# Patient Record
Sex: FEMALE
[De-identification: names, ages, dates, MRNs, and addresses within clinical notes are randomized per-mention and may not be internally consistent; named-entity substitution may affect disease eponyms.]

---

## 2020-06-26 ENCOUNTER — HOSPITAL ENCOUNTER (EMERGENCY)
Dept: HOSPITAL 97 - ER | Age: 30
Discharge: HOME | End: 2020-06-26
Payer: COMMERCIAL

## 2020-06-26 DIAGNOSIS — R10.9: ICD-10-CM

## 2020-06-26 DIAGNOSIS — Z88.0: ICD-10-CM

## 2020-06-26 DIAGNOSIS — O26.892: Primary | ICD-10-CM

## 2020-06-26 LAB
BUN BLD-MCNC: 8 MG/DL (ref 7–18)
GLUCOSE SERPLBLD-MCNC: 101 MG/DL (ref 74–106)
HCG SERPL-ACNC: (no result) MIU/ML (ref 1–3)
HCT VFR BLD CALC: 41.1 % (ref 36–45)
LYMPHOCYTES # SPEC AUTO: 1.3 K/UL (ref 0.7–4.9)
PMV BLD: 10.8 FL (ref 7.6–11.3)
POTASSIUM SERPL-SCNC: 3.7 MMOL/L (ref 3.5–5.1)
RBC # BLD: 4.38 M/UL (ref 3.86–4.86)
UA COMPLETE W REFLEX CULTURE PNL UR: (no result)

## 2020-06-26 PROCEDURE — 87490 CHLMYD TRACH DNA DIR PROBE: CPT

## 2020-06-26 PROCEDURE — 84702 CHORIONIC GONADOTROPIN TEST: CPT

## 2020-06-26 PROCEDURE — 81015 MICROSCOPIC EXAM OF URINE: CPT

## 2020-06-26 PROCEDURE — 76705 ECHO EXAM OF ABDOMEN: CPT

## 2020-06-26 PROCEDURE — 99284 EMERGENCY DEPT VISIT MOD MDM: CPT

## 2020-06-26 PROCEDURE — 76815 OB US LIMITED FETUS(S): CPT

## 2020-06-26 PROCEDURE — 85025 COMPLETE CBC W/AUTO DIFF WBC: CPT

## 2020-06-26 PROCEDURE — 87088 URINE BACTERIA CULTURE: CPT

## 2020-06-26 PROCEDURE — 87086 URINE CULTURE/COLONY COUNT: CPT

## 2020-06-26 PROCEDURE — 87590 N.GONORRHOEAE DNA DIR PROB: CPT

## 2020-06-26 PROCEDURE — 81003 URINALYSIS AUTO W/O SCOPE: CPT

## 2020-06-26 PROCEDURE — 86900 BLOOD TYPING SEROLOGIC ABO: CPT

## 2020-06-26 PROCEDURE — 80048 BASIC METABOLIC PNL TOTAL CA: CPT

## 2020-06-26 PROCEDURE — 86901 BLOOD TYPING SEROLOGIC RH(D): CPT

## 2020-06-26 PROCEDURE — 81025 URINE PREGNANCY TEST: CPT

## 2020-06-26 PROCEDURE — 36415 COLL VENOUS BLD VENIPUNCTURE: CPT

## 2020-06-26 NOTE — ER
Nurse's Notes                                                                                     

 [facility]                                                                 

Name: Bhargav Ojeda                                                                              

Age: 30 yrs                                                                                       

Sex: Female                                                                                       

: 1990                                                                                   

MRN: [id number]                                                                                   

Arrival Date: 2020                                                                          

Time: 18:38                                                                                       

Account#: [id number]                                                                            

Bed 6                                                                                             

Private MD:                                                                                       

Diagnosis: Flank Pain;Pregnancy                                                                   

                                                                                                  

Presentation:                                                                                     

                                                                                             

19:06 Chief complaint: Patient states: Pain from Lower left side of my back that goes to the  ca1 

      front . And I having sharp pain underneath my belly button area down to my vaginal          

      area. My OB told me to come to the ER. Pregnant 18 wks and 4 days. Denies vaginal           

      bleeding. Denies urinary symptoms. Took Tylenol extra strength 1.5 hrs PTA. Coronavirus     

      screen: Proceed with normal triage. Patient denies a cough. Patient denies shortness of     

      breath or difficulty breathing. Patient denies measured and/or subjective temperature       

      greater than 100.4F prior to today's visit. Patient denies travel on a cruise ship or       

      to a country the [facility] currently lists as an affected area. Patient denies contact with       

      known and/or suspected case of COVID-19. Ebola Screen: Patient negative for fever           

      greater than or equal to 101.5 degrees Fahrenheit, and additional compatible Ebola          

      Virus Disease symptoms Patient denies exposure to infectious person. Patient denies         

      travel to an Ebola-affected area in the 21 days before illness onset. No symptoms or        

      risks identified at this time. Initial Sepsis Screen: Does the patient meet any 2           

      criteria? No. Patient's initial sepsis screen is negative. Does the patient have a          

      suspected source of infection? No. Patient's initial sepsis screen is negative. Risk        

      Assessment: Do you want to hurt yourself or someone else? Patient reports no desire to      

      harm self or others. Onset of symptoms was 2020.                                   

19:06 Method Of Arrival: Ambulatory                                                           ca1 

19:06 Acuity: SERENA 3                                                                           ca1 

                                                                                                  

OB/GYN:                                                                                           

19:10  5, Full Term 4, Living 4, LMP 2020                                         ca1 

                                                                                                  

Historical:                                                                                       

- Allergies:                                                                                      

19:10 PENICILLINS;                                                                            ca1 

- Home Meds:                                                                                      

19:10 Prenatal Vitamin Oral [Active];                                                         ca1 

- PMHx:                                                                                           

19:10 Asthma;                                                                                 ca1 

- PSHx:                                                                                           

19:10 None;                                                                                   ca1 

                                                                                                  

- Immunization history:: Adult Immunizations up to date.                                          

- Social history:: Smoking status: Patient denies any tobacco usage or history of.                

                                                                                                  

                                                                                                  

Screenin:41 Abuse screen: Denies threats or abuse. Denies injuries from another. Nutritional        rr5 

      screening: No deficits noted. Tuberculosis screening: No symptoms or risk factors           

      identified. Fall Risk None identified. Total Means Fall Scale indicates No Risk (0-24       

      pts).                                                                                       

                                                                                                  

Assessment:                                                                                       

20:10 General: Appears in no apparent distress. uncomfortable, Behavior is calm, cooperative, rr5 

      appropriate for age.                                                                        

20:10 Pain: Complains of pain in pelvis Pain radiates to left flank Pain currently is 6 out   rr5 

      of 10 on a pain scale. Quality of pain is described as sharp, stabbing, Pain began          

      gradually, Is intermittent. Neuro: Level of Consciousness is awake, alert, obeys            

      commands, Oriented to person, place, time, situation. Cardiovascular: Capillary refill      

      < 3 seconds Patient's skin is warm and dry. Respiratory: Airway is patent Respiratory       

      effort is even, unlabored, Respiratory pattern is regular, symmetrical. GI: No signs        

      and/or symptoms were reported involving the gastrointestinal system. : Reports pain       

      in left flank(s), pelvic area. EENT: No signs and/or symptoms were reported regarding       

      the EENT system. Derm: Skin is intact, is healthy with good turgor, Skin temperature is     

      warm. Musculoskeletal: Circulation, motion, and sensation intact. Capillary refill < 3      

      seconds.                                                                                    

20:41 Reassessment: Patient appears in no apparent distress at this time. Patient is alert,   rr5 

      oriented x 3, equal unlabored respirations, skin warm/dry/pink. awaiting for results.       

21:15 Reassessment: Patient appears in no apparent distress at this time. ultrasound at       rr5 

      bedside.                                                                                    

22:33 Reassessment: Patient appears in no apparent distress at this time. Patient is alert,   rr5 

      oriented x 3, equal unlabored respirations, skin warm/dry/pink. no complaints made.         

23:41 Reassessment: Patient appears in no apparent distress at this time. Patient is alert,   rr5 

      oriented x 3, equal unlabored respirations, skin warm/dry/pink. awaiting for ultrasound     

      result.                                                                                     

23:59 Reassessment: Patient appears in no apparent distress at this time. discharge           rr5 

      instruction given and explained without complaints made.                                    

                                                                                                  

Vital Signs:                                                                                      

19:06  / 72; Pulse 75; Resp 16 S; Temp 97.3(TE); Pulse Ox 100% on R/A; Weight 64.41 kg  ca1 

      (R); Height 5 ft. 1 in. (154.94 cm) (R); Pain 6/10;                                         

20:30  / 78; Pulse 80; Resp 17; Pulse Ox 99% ;                                          rr5 

21:30  / 61; Pulse 75; Resp 19; Pulse Ox 100% ;                                         rr5 

22:14  / 85; Pulse 81; Resp 18; Pulse Ox 100% on R/A;                                   rv  

23:30  / 70; Pulse 79; Resp 17; Temp 98; Pulse Ox 98% ;                                 rr5 

19:06 Body Mass Index 26.83 (64.41 kg, 154.94 cm)                                             ca1 

                                                                                                  

ED Course:                                                                                        

18:38 Patient arrived in ED.                                                                  ag5 

19:09 Triage completed.                                                                       ca1 

19:10 Arm band placed on right wrist.                                                         ca1 

20:29 Ed Engel MD is Attending Physician.                                             mh7 

20:30 Maurizio Davenport RN is Primary Nurse.                                                    rr5 

20:30 Pulse ox on. NIBP on.                                                                   rr5 

20:32 Urine Culture Sent.                                                                     ca1 

20:32 Urine Microscopic Only Sent.                                                            ca1 

21:40 US OB Limited In Process Unspecified.                                                   EDMS

21:40 US Abdomen Limited In Process Unspecified.                                              EDMS

21:40 Inserted saline lock: 20 gauge in right hand, using aseptic technique. Blood collected. rr5 

22:38 Patient has correct armband on for positive identification. Bed in low position. Call   rr5 

      light in reach.                                                                             

23:55 IV discontinued, intact, bleeding controlled, No redness/swelling at site. Pressure     rr5 

      dressing applied.                                                                           

                                                                                             

00:23 No provider procedures requiring assistance completed.                                  rr5 

                                                                                                  

Administered Medications:                                                                         

No medications were administered                                                                  

                                                                                                  

                                                                                                  

Outcome:                                                                                          

                                                                                             

23:47 Discharge ordered by MD.                                                                mh7 

23:55 Discharged to home ambulatory.                                                          rr5 

23:55 Condition: stable                                                                           

23:55 Discharge instructions given to patient, Instructed on discharge instructions, follow   rr5 

      up and referral plans. Demonstrated understanding of instructions, follow-up care.          

23:59 Patient left the ED.                                                                    rr5 

                                                                                                  

Signatures:                                                                                       

Dispatcher [employer]                           EDMS                                                 

Zeeshan Hackett RN RN   rv                                                   

Maurizio Davenport RN                      RN   rr5                                                  

Alena Newman RN                        RN   ca1                                                  

Andrade Casey                                ag5                                                  

Engel, Ed, MD                     MD   mh7                                                  

                                                                                                  

**************************************************************************************************

## 2020-06-26 NOTE — EDPHYS
Physician Documentation                                                                           

 [facility]                                                                 

Name: Bhargav Ojeda                                                                              

Age: 30 yrs                                                                                       

Sex: Female                                                                                       

: 1990                                                                                   

MRN: [id number]                                                                                   

Arrival Date: 2020                                                                          

Time: 18:38                                                                                       

Account#: [id number]                                                                            

Bed 6                                                                                             

Private MD:                                                                                       

ED Physician Ed Engel                                                                      

HPI:                                                                                              

                                                                                             

20:56 This 30 yrs old  Female presents to ER via Ambulatory with complaints of Back   mh7 

      Pain, Pelvic Pain, 18 wks Preg.                                                             

20:57 The patient complains of pain in the left flank. The pain radiates to the pelvis.       mh7 

      Onset: The symptoms/episode began/occurred today. Modifying factors: The symptoms are       

      alleviated by nothing. the symptoms are aggravated by nothing. Associated signs and         

      symptoms: Pertinent negatives: diarrhea, dizziness, dysuria, fever, urinary frequency,      

      headache, hematuria, nausea, pain radiating to the lower extremities, vomiting.             

      Severity of pain: At its worst the pain was moderate today, in the emergency department     

      the pain has improved markedly.                                                             

                                                                                                  

OB/GYN:                                                                                           

19:10  5, Full Term 4, Living 4, LMP 2020                                         ca1 

                                                                                                  

Historical:                                                                                       

- Allergies:                                                                                      

19:10 PENICILLINS;                                                                            ca1 

- Home Meds:                                                                                      

19:10 Prenatal Vitamin Oral [Active];                                                         ca1 

- PMHx:                                                                                           

19:10 Asthma;                                                                                 ca1 

- PSHx:                                                                                           

19:10 None;                                                                                   ca1 

                                                                                                  

- Immunization history:: Adult Immunizations up to date.                                          

- Social history:: Smoking status: Patient denies any tobacco usage or history of.                

                                                                                                  

                                                                                                  

ROS:                                                                                              

20:57 Constitutional: Negative for fever, chills, and weight loss, Eyes: Negative for injury, mh7 

      pain, redness, and discharge, ENT: Negative for injury, pain, and discharge, Neck:          

      Negative for injury, pain, and swelling, Cardiovascular: Negative for chest pain,           

      palpitations, and edema, Respiratory: Negative for shortness of breath, cough,              

      wheezing, and pleuritic chest pain, : Negative for injury, bleeding, discharge, and       

      swelling, MS/Extremity: Negative for injury and deformity, Skin: Negative for injury,       

      rash, and discoloration, Neuro: Negative for headache, weakness, numbness, tingling,        

      and seizure, Psych: Negative for depression, anxiety, suicide ideation, homicidal           

      ideation, and hallucinations, Allergy/Immunology: Negative for hives, rash, and             

      allergies, Endocrine: Negative for neck swelling, polydipsia, polyuria, polyphagia, and     

      marked weight changes, Hematologic/Lymphatic: Negative for swollen nodes, abnormal          

      bleeding, and unusual bruising.                                                             

                                                                                                  

Exam:                                                                                             

20:57 Constitutional:  This is a well developed, well nourished patient who is awake, alert,  mh7 

      and in no acute distress. Head/Face:  Normocephalic, atraumatic. Eyes:  Pupils equal        

      round and reactive to light, extra-ocular motions intact.  Lids and lashes normal.          

      Conjunctiva and sclera are non-icteric and not injected.  Cornea within normal limits.      

      Periorbital areas with no swelling, redness, or edema. ENT:  Nares patent. No nasal         

      discharge, no septal abnormalities noted.  Tympanic membranes are normal and external       

      auditory canals are clear.  Oropharynx with no redness, swelling, or masses, exudates,      

      or evidence of obstruction, uvula midline.  Mucous membranes moist. Neck:  Trachea          

      midline, no thyromegaly or masses palpated, and no cervical lymphadenopathy.  Supple,       

      full range of motion without nuchal rigidity, or vertebral point tenderness.  No            

      Meningismus. Chest/axilla:  Normal chest wall appearance and motion.  Nontender with no     

      deformity.  No lesions are appreciated. Cardiovascular:  Regular rate and rhythm with a     

      normal S1 and S2.  No gallops, murmurs, or rubs.  Normal PMI, no JVD.  No pulse             

      deficits. Respiratory:  Lungs have equal breath sounds bilaterally, clear to                

      auscultation and percussion.  No rales, rhonchi or wheezes noted.  No increased work of     

      breathing, no retractions or nasal flaring. Abdomen/GI:  Soft, non-tender, with normal      

      bowel sounds.  No distension or tympany.  No guarding or rebound.  No evidence of           

      tenderness throughout.                                                                      

20:57 Skin:  Warm, dry with normal turgor.  Normal color with no rashes, no lesions, and no       

      evidence of cellulitis. MS/ Extremity:  Pulses equal, no cyanosis.  Neurovascular           

      intact.  Full, normal range of motion. Neuro:  Awake and alert, GCS 15, oriented to         

      person, place, time, and situation.  Cranial nerves II-XII grossly intact.  Motor           

      strength 5/5 in all extremities.  Sensory grossly intact.  Cerebellar exam normal.          

      Normal gait. Psych:  Awake, alert, with orientation to person, place and time.              

      Behavior, mood, and affect are within normal limits.                                        

20:57 Back: pain, is absent, ROM is normal, normal spinal alignment noted, CVA tenderness,        

      that is mild, is noted on the left, muscle spasm, is not present.                           

20:57 : CVA tenderness, on the left, Pelvic Exam: The exam is refused by the                    

      patient/guardian.  The risks and consequences are understood by the patient, Bladder:       

      is normal.                                                                                  

                                                                                                  

Vital Signs:                                                                                      

19:06  / 72; Pulse 75; Resp 16 S; Temp 97.3(TE); Pulse Ox 100% on R/A; Weight 64.41 kg  ca1 

      (R); Height 5 ft. 1 in. (154.94 cm) (R); Pain 6/10;                                         

20:30  / 78; Pulse 80; Resp 17; Pulse Ox 99% ;                                          rr5 

21:30  / 61; Pulse 75; Resp 19; Pulse Ox 100% ;                                         rr5 

22:14  / 85; Pulse 81; Resp 18; Pulse Ox 100% on R/A;                                   rv  

23:30  / 70; Pulse 79; Resp 17; Temp 98; Pulse Ox 98% ;                                 rr5 

19:06 Body Mass Index 26.83 (64.41 kg, 154.94 cm)                                             ca1 

                                                                                                  

MDM:                                                                                              

20:45 Patient medically screened.                                                             [facility] 

23:46 Differential diagnosis: nephrolithiasis, pyelonephritis, UTI. Data reviewed: vital      [facility] 

      signs, nurses notes, lab test result(s), Beta HCG: CBC, electrolytes, Rh: urinalysis.       

      Data interpreted: Pulse oximetry: on room air is 100 %. Interpretation: normal.             

      Counseling: I had a detailed discussion with the patient and/or guardian regarding: the     

      historical points, exam findings, and any diagnostic results supporting the                 

      discharge/admit diagnosis, lab results, radiology results, the need for outpatient          

      follow up, to return to the emergency department if symptoms worsen or persist or if        

      there are any questions or concerns that arise at home. Response to treatment: the          

      patient's symptoms have resolved after treatment, the patient's blood pressure is in an     

      acceptable range, mental status has returned to baseline, the patient no longer shows       

      bradycardia, the patient is not short of breath, the patient is not tachycardic, the        

      patient's pain is gone, the patient's temperature has normalized.                           

                                                                                                  

                                                                                             

18:40 Order name: Urine Culture                                                               [facility] 

                                                                                             

18:40 Order name: Urine Microscopic Only; Complete Time: 21:44                                [facility] 

                                                                                             

19:51 Order name: Urine Pregnancy--Ancillary (enter results); Complete Time: 20:45            3 

06/26                                                                                             

19:51 Order name: Urine Dipstick--Ancillary (enter results); Complete Time: 20:45             3 

06/26                                                                                             

20:47 Order name: CBC with Diff; Complete Time: 22:29                                         [facility] 

                                                                                             

18:40 Order name: Urine Pregnancy Test (obtain specimen); Complete Time: 20:26                [facility] 

                                                                                             

18:40 Order name: Urine Dipstick-Ancillary (obtain specimen); Complete Time: 20:26            [facility] 

                                                                                             

20:47 Order name: Basic Metabolic Panel; Complete Time: 22:29                                                                                                                              

20:47 Order name: Quantitative Hcg; Complete Time: 22:29                                      [facility] 

                                                                                             

20:47 Order name: US OB Limited                                                               [facility] 

                                                                                             

20:47 Order name: US Abdomen Limited                                                          [facility] 

                                                                                             

20:48 Order name: Abo/rh Typing; Complete Time: 22:29                                         [facility] 

                                                                                             

21:04 Order name: GC (Azam/Chl) Probe URINE                                                    EDMS

                                                                                                  

Administered Medications:                                                                         

No medications were administered                                                                  

                                                                                                  

                                                                                                  

Disposition:                                                                                      

20 23:47 Discharged to Home. Impression: Flank Pain, Pregnancy.                             

- Condition is Stable.                                                                            

- Discharge Instructions: Abdominal Pain During Pregnancy, Easy-to-Read, Flank Pain,              

  Easy-to-Read.                                                                                   

                                                                                                  

- Medication Reconciliation Form, Thank You Letter, Antibiotic Education, Prescription            

  Opioid Use form.                                                                                

- Follow up: Private Physician; When: 2 - 3 days; Reason: Worsening of condition,                 

  Recheck today's complaints, Re-evaluation by your physician.                                    

- Problem is an acute exacerbation.                                                               

- Symptoms are resolved.                                                                          

                                                                                                  

                                                                                                  

                                                                                                  

Signatures:                                                                                       

Dispatcher [employer]                           EDMS                                                 

Tali Morrow, FNP-C                 FNP-Csnw                                                  

Maurizio Davenport RN                      RN   rr5                                                  

Alena Newman RN RN   ca1                                                  

Ed Engel MD MD   mh7                                                  

                                                                                                  

Corrections: (The following items were deleted from the chart)                                    

21:04 18:41 GC (Gonorr/Clamydia) Probe+R.LAB.BRZ ordered. EDMS                                EDMS

23:59 23:47 2020 23:47 Discharged to Home. Impression: Flank Pain; Pregnancy. Condition rr5 

      is Stable. Forms are Medication Reconciliation Form, Thank You Letter, Antibiotic           

      Education, Prescription Opioid Use. Follow up: Private Physician; When: 2 - 3 days;         

      Reason: Worsening of condition, Recheck today's complaints, Re-evaluation by your           

      physician. Problem is an acute exacerbation. Symptoms are resolved. mh7                     

                                                                                                  

**************************************************************************************************

## 2020-06-27 VITALS — TEMPERATURE: 97.3 F | OXYGEN SATURATION: 100 %

## 2020-06-27 VITALS — DIASTOLIC BLOOD PRESSURE: 85 MMHG | SYSTOLIC BLOOD PRESSURE: 110 MMHG

## 2023-05-01 NOTE — RAD REPORT
EXAM DESCRIPTION:  Abdomen Exam Limited

 

CLINICAL HISTORY:  FLANK PAIN

 

COMPARISON:  None.

 

TECHNIQUE:  Real-time sonographic images of the right upper abdomen were obtained using a curved mult
ihertz transducer.

 

FINDINGS:  Pancreas: Not well visualized due to overlying structures.

Vascular: Not well-visualized due to overlying structures.

Liver: The liver has normal contour and echogenicity. Hepatopedal flow in the portal vein.   Findings
 confirmed with color and spectral Doppler imaging. The common bile duct measures 0.2 cm.

Gallbladder: The gallbladder has a normal appearance. No gallstones identified. No wall thickening or
 pericholecystic fluid.

Kidneys: The right kidney measures 10.8 cm in length. The left kidney measures 11.5 cm in length and 
moderate right and minimal left hydronephrosis.

 

IMPRESSION:  1. Moderate right and minimal left hydronephrosis.

 

Electronically signed by:   Gonzalo Mcleod   6/26/2020 10:13 PM CDT Workstation: [id number]

 

 

Due to temporary technical issues with the PACS/Fluency reporting system, reports are being signed by
 the in house radiologist without review as a courtesy to ensure prompt reporting. The interpreting r
adiologist is fully responsible for the content of the report.
EXAM DESCRIPTION:  US - OB Limited - 6/26/2020 9:39 pm

 

CLINICAL HISTORY:  ABD PAIN

Fetal age.

 

COMPARISON:  No comparisons

 

FINDINGS:  A limited obstetrical ultrasound was requested.

 

A single transverse presenting gestation is identified. Heart rate normal. Estimated fetal age is 18 
weeks 4 days, [facility] 11/23/2020. Cervix is long and closed. Anterior placenta without evidence of placen
ta previa or abruption. Normal amniotic fluid volume.
negative